# Patient Record
Sex: FEMALE | Race: BLACK OR AFRICAN AMERICAN | Employment: FULL TIME | URBAN - METROPOLITAN AREA
[De-identification: names, ages, dates, MRNs, and addresses within clinical notes are randomized per-mention and may not be internally consistent; named-entity substitution may affect disease eponyms.]

---

## 2023-07-12 ENCOUNTER — OFFICE VISIT (OUTPATIENT)
Dept: PRIMARY CARE | Facility: CLINIC | Age: 38
End: 2023-07-12
Payer: COMMERCIAL

## 2023-07-12 VITALS
HEIGHT: 65 IN | HEART RATE: 88 BPM | DIASTOLIC BLOOD PRESSURE: 100 MMHG | SYSTOLIC BLOOD PRESSURE: 157 MMHG | WEIGHT: 150 LBS | BODY MASS INDEX: 24.99 KG/M2

## 2023-07-12 DIAGNOSIS — D50.8 IRON DEFICIENCY ANEMIA SECONDARY TO INADEQUATE DIETARY IRON INTAKE: ICD-10-CM

## 2023-07-12 DIAGNOSIS — Z00.00 ANNUAL PHYSICAL EXAM: Primary | ICD-10-CM

## 2023-07-12 DIAGNOSIS — I10 BENIGN HYPERTENSION: ICD-10-CM

## 2023-07-12 PROBLEM — D50.9 IRON DEFICIENCY ANEMIA: Status: ACTIVE | Noted: 2021-03-12

## 2023-07-12 PROBLEM — R73.03 PREDIABETES: Status: ACTIVE | Noted: 2021-03-10

## 2023-07-12 PROBLEM — H52.203 MYOPIA OF BOTH EYES WITH ASTIGMATISM: Status: ACTIVE | Noted: 2023-07-12

## 2023-07-12 PROBLEM — R03.0 ELEVATED BLOOD PRESSURE READING: Status: ACTIVE | Noted: 2021-03-10

## 2023-07-12 PROBLEM — H52.13 MYOPIA OF BOTH EYES WITH ASTIGMATISM: Status: ACTIVE | Noted: 2023-07-12

## 2023-07-12 LAB
ALANINE AMINOTRANSFERASE (SGPT) (U/L) IN SER/PLAS: 10 U/L (ref 7–45)
ALBUMIN (G/DL) IN SER/PLAS: 4.4 G/DL (ref 3.4–5)
ALKALINE PHOSPHATASE (U/L) IN SER/PLAS: 56 U/L (ref 33–110)
ANION GAP IN SER/PLAS: 15 MMOL/L (ref 10–20)
ASPARTATE AMINOTRANSFERASE (SGOT) (U/L) IN SER/PLAS: 16 U/L (ref 9–39)
BASOPHILS (10*3/UL) IN BLOOD BY AUTOMATED COUNT: 0.04 X10E9/L (ref 0–0.1)
BASOPHILS/100 LEUKOCYTES IN BLOOD BY AUTOMATED COUNT: 0.8 % (ref 0–2)
BILIRUBIN TOTAL (MG/DL) IN SER/PLAS: 0.4 MG/DL (ref 0–1.2)
CALCIDIOL (25 OH VITAMIN D3) (NG/ML) IN SER/PLAS: 11 NG/ML
CALCIUM (MG/DL) IN SER/PLAS: 9.9 MG/DL (ref 8.6–10.6)
CARBON DIOXIDE, TOTAL (MMOL/L) IN SER/PLAS: 24 MMOL/L (ref 21–32)
CHLORIDE (MMOL/L) IN SER/PLAS: 102 MMOL/L (ref 98–107)
CHOLESTEROL (MG/DL) IN SER/PLAS: 142 MG/DL (ref 0–199)
CHOLESTEROL IN HDL (MG/DL) IN SER/PLAS: 60.4 MG/DL
CHOLESTEROL/HDL RATIO: 2.4
COBALAMIN (VITAMIN B12) (PG/ML) IN SER/PLAS: 1019 PG/ML (ref 211–911)
CREATININE (MG/DL) IN SER/PLAS: 0.81 MG/DL (ref 0.5–1.05)
EOSINOPHILS (10*3/UL) IN BLOOD BY AUTOMATED COUNT: 0.04 X10E9/L (ref 0–0.7)
EOSINOPHILS/100 LEUKOCYTES IN BLOOD BY AUTOMATED COUNT: 0.8 % (ref 0–6)
ERYTHROCYTE DISTRIBUTION WIDTH (RATIO) BY AUTOMATED COUNT: 14.8 % (ref 11.5–14.5)
ERYTHROCYTE MEAN CORPUSCULAR HEMOGLOBIN CONCENTRATION (G/DL) BY AUTOMATED: 31.5 G/DL (ref 32–36)
ERYTHROCYTE MEAN CORPUSCULAR VOLUME (FL) BY AUTOMATED COUNT: 89 FL (ref 80–100)
ERYTHROCYTES (10*6/UL) IN BLOOD BY AUTOMATED COUNT: 4.26 X10E12/L (ref 4–5.2)
GFR FEMALE: >90 ML/MIN/1.73M2
GLUCOSE (MG/DL) IN SER/PLAS: 85 MG/DL (ref 74–99)
HEMATOCRIT (%) IN BLOOD BY AUTOMATED COUNT: 38.1 % (ref 36–46)
HEMOGLOBIN (G/DL) IN BLOOD: 12 G/DL (ref 12–16)
IMMATURE GRANULOCYTES/100 LEUKOCYTES IN BLOOD BY AUTOMATED COUNT: 0 % (ref 0–0.9)
LDL: 65 MG/DL (ref 0–99)
LEUKOCYTES (10*3/UL) IN BLOOD BY AUTOMATED COUNT: 4.9 X10E9/L (ref 4.4–11.3)
LYMPHOCYTES (10*3/UL) IN BLOOD BY AUTOMATED COUNT: 2.16 X10E9/L (ref 1.2–4.8)
LYMPHOCYTES/100 LEUKOCYTES IN BLOOD BY AUTOMATED COUNT: 43.9 % (ref 13–44)
MONOCYTES (10*3/UL) IN BLOOD BY AUTOMATED COUNT: 0.52 X10E9/L (ref 0.1–1)
MONOCYTES/100 LEUKOCYTES IN BLOOD BY AUTOMATED COUNT: 10.6 % (ref 2–10)
NEUTROPHILS (10*3/UL) IN BLOOD BY AUTOMATED COUNT: 2.16 X10E9/L (ref 1.2–7.7)
NEUTROPHILS/100 LEUKOCYTES IN BLOOD BY AUTOMATED COUNT: 43.9 % (ref 40–80)
NRBC (PER 100 WBCS) BY AUTOMATED COUNT: 0 /100 WBC (ref 0–0)
PLATELETS (10*3/UL) IN BLOOD AUTOMATED COUNT: 274 X10E9/L (ref 150–450)
POTASSIUM (MMOL/L) IN SER/PLAS: 4.5 MMOL/L (ref 3.5–5.3)
PROTEIN TOTAL: 8.3 G/DL (ref 6.4–8.2)
SODIUM (MMOL/L) IN SER/PLAS: 136 MMOL/L (ref 136–145)
THYROTROPIN (MIU/L) IN SER/PLAS BY DETECTION LIMIT <= 0.05 MIU/L: 1.61 MIU/L (ref 0.44–3.98)
TRIGLYCERIDE (MG/DL) IN SER/PLAS: 81 MG/DL (ref 0–149)
UREA NITROGEN (MG/DL) IN SER/PLAS: 16 MG/DL (ref 6–23)
VLDL: 16 MG/DL (ref 0–40)

## 2023-07-12 PROCEDURE — 82607 VITAMIN B-12: CPT

## 2023-07-12 PROCEDURE — 83550 IRON BINDING TEST: CPT

## 2023-07-12 PROCEDURE — 85025 COMPLETE CBC W/AUTO DIFF WBC: CPT

## 2023-07-12 PROCEDURE — 99203 OFFICE O/P NEW LOW 30 MIN: CPT | Performed by: NURSE PRACTITIONER

## 2023-07-12 PROCEDURE — 82306 VITAMIN D 25 HYDROXY: CPT

## 2023-07-12 PROCEDURE — 83540 ASSAY OF IRON: CPT

## 2023-07-12 PROCEDURE — 80061 LIPID PANEL: CPT

## 2023-07-12 PROCEDURE — 84443 ASSAY THYROID STIM HORMONE: CPT

## 2023-07-12 PROCEDURE — 93000 ELECTROCARDIOGRAM COMPLETE: CPT | Performed by: NURSE PRACTITIONER

## 2023-07-12 PROCEDURE — 3077F SYST BP >= 140 MM HG: CPT | Performed by: NURSE PRACTITIONER

## 2023-07-12 PROCEDURE — 99385 PREV VISIT NEW AGE 18-39: CPT | Performed by: NURSE PRACTITIONER

## 2023-07-12 PROCEDURE — 80053 COMPREHEN METABOLIC PANEL: CPT

## 2023-07-12 PROCEDURE — 3080F DIAST BP >= 90 MM HG: CPT | Performed by: NURSE PRACTITIONER

## 2023-07-12 PROCEDURE — 1036F TOBACCO NON-USER: CPT | Performed by: NURSE PRACTITIONER

## 2023-07-12 RX ORDER — OMEPRAZOLE 20 MG/1
20 CAPSULE, DELAYED RELEASE ORAL
COMMUNITY
Start: 2018-03-08

## 2023-07-12 RX ORDER — AMLODIPINE BESYLATE 5 MG/1
5 TABLET ORAL DAILY
Qty: 90 TABLET | Refills: 0 | Status: SHIPPED | OUTPATIENT
Start: 2023-07-12 | End: 2024-01-05

## 2023-07-12 ASSESSMENT — PATIENT HEALTH QUESTIONNAIRE - PHQ9
1. LITTLE INTEREST OR PLEASURE IN DOING THINGS: NOT AT ALL
2. FEELING DOWN, DEPRESSED OR HOPELESS: NOT AT ALL
SUM OF ALL RESPONSES TO PHQ9 QUESTIONS 1 AND 2: 0

## 2023-07-12 ASSESSMENT — ENCOUNTER SYMPTOMS
DEPRESSION: 0
LOSS OF SENSATION IN FEET: 0
OCCASIONAL FEELINGS OF UNSTEADINESS: 0

## 2023-07-13 LAB
IRON (UG/DL) IN SER/PLAS: 42 UG/DL (ref 35–150)
IRON BINDING CAPACITY (UG/DL) IN SER/PLAS: 449 UG/DL (ref 240–445)
IRON SATURATION (%) IN SER/PLAS: 9 % (ref 25–45)

## 2023-07-14 DIAGNOSIS — R79.89 LOW VITAMIN D LEVEL: ICD-10-CM

## 2023-07-14 RX ORDER — ERGOCALCIFEROL 1.25 MG/1
50000 CAPSULE ORAL
Qty: 12 CAPSULE | Refills: 1 | Status: SHIPPED | OUTPATIENT
Start: 2023-07-14 | End: 2024-01-25 | Stop reason: SDUPTHER

## 2023-07-14 NOTE — TELEPHONE ENCOUNTER
----- Message from SAV Cheatham sent at 7/14/2023 12:10 PM EDT -----  Vitamin D is low. Counseled on foods high in vitamin D (tuna, salmon, milk, orange juice, soy milk, cereals, beef liver, cheese, egg yolks and mushrooms). Start vitamin D 50,000 units weekly for 6 months, then switch to OTC vitamin D 2000 units daily.  Rest of blood work fairly normal.

## 2023-07-14 NOTE — PROGRESS NOTES
"Reason for Visit: Annual Physical Exam    HPI: Maddy is a 38 year old female who presents to the office today as a new patient for a physical exam and concerns of high blood pressure. She reported that she has had some headaches. No dizziness, chest pain, heart palpitations or shortness of breath.     Past surgical history of D&C in 2004. Medical history of acid reflux and iron deficiency anemia. Currently on Omeprazole. No known drug allergies.    She is . Has 4 children; ages 19, 17, 13 & 11. She works as a RN at  [employer]. Monthly menses has been normal. Had pap on 3/19/2021 (result normal). She does not smoke cigarettes or use recreational drugs. Drinks alcohol socially.     Mother with history of lung cancer with brain metastasis; diagnosed in 2019. Mother is alive. Maternal grandmother and sister with history of hypertension.      Active Problem List  Patient Active Problem List   Diagnosis    Elevated blood pressure reading    Human papilloma virus infection    Iron deficiency anemia secondary to inadequate dietary iron intake    Iron deficiency anemia    Myopia of both eyes with astigmatism    Prediabetes       Comprehensive Medical/Surgical/Social/Family History  History reviewed. No pertinent past medical history.  History reviewed. No pertinent surgical history.  Social History     Social History Narrative    Not on file         Allergies and Medications  Patient has no known allergies.  Current Outpatient Medications on File Prior to Visit   Medication Sig Dispense Refill    omeprazole (PriLOSEC) 20 mg DR capsule Take 1 capsule (20 mg) by mouth once daily in the morning. Take before meals.       No current facility-administered medications on file prior to visit.         ROS otherwise negative aside from what was mentioned above in HPI.    Vitals  BP (!) 157/100   Pulse 88   Ht 1.651 m (5' 5\")   Wt 68 kg (150 lb)   BMI 24.96 kg/m²   Body mass index is 24.96 kg/m².  Physical Exam  Gen: " Alert, NAD  HEENT:  PERRLA, EOMI, conjunctiva and sclera normal in appearance. External auditory canals/TMs normal; Oral cavity and posterior pharynx without lesions/exudate  Neck:  Supple with FROM; No masses/nodes palpable; Thyroid nontender and without nodules; No FARIBA  Respiratory:  Lungs CTAB  Cardiovascular:  Heart RRR. No M/R/G. Peripheral pulses equal bilaterally  Abdomen:  Soft, nontender, BS present throughout; No R/G/R; No HSM or masses palpated  Extremities:  FROM all extremities; Muscle strength grossly normal with good tone  Neuro:  CN II-XII intact; Reflexes 2+/2+; Gross motor and sensory intact  Skin:  No suspicious lesions present  Breast: No masses, skin lesions or nipple discharges, no axillary lymphadenopathy    Assessment and Plan:  Problem List Items Addressed This Visit          Hematology and Neoplasia    Iron deficiency anemia secondary to inadequate dietary iron intake    Overview     Last Assessment & Plan: Formatting of this note is different from the original. Will check CBC. Component 10/19/2018 3/10/2021 Hemoglobin 11.5 (L) 9.8 (L) Hematocrit 35.8 (L) 32.1 (L) MCV 87 75 (L) US PELVIS+BLADDER   CLINICAL HISTORY follow up of ovarian cyst  COMPARISON June 17, 2016  TECHNIQUE Transabdominal and transvaginal pelvic ultrasound.  FINDINGS The uterus is mildly enlarged measuring 9.8 x 5.5 x 6.6 cm. The endometrial echo complex is normal in thickness measuring 7 mm. No focal uterine masses are identified.   The right ovary is normal in size measuring 3 x 1.8 x 2.4 cm, with physiologic follicles. The left ovary is also normal in appearance with physiologic follicles measuring 3.1 x 1.7 x 2.3 cm. The previously noted hemorrhagic left ovarian cyst has resolved.  There is trace, physiologic free fluid.  IMPRESSION Interval resolution of the previously noted hemorrhagic left ovarian cyst.  Normal pelvic ultrasound.  MACRO NONE Specimen Collected: 02/06/17         Relevant Orders    Iron and TIBC  (Completed)     Other Visit Diagnoses       Annual physical exam    -  Primary    Relevant Orders    CBC and Auto Differential (Completed)    Comprehensive Metabolic Panel (Completed)    TSH with reflex to Free T4 if abnormal (Completed)    Vitamin B12 (Completed)    Lipid Panel (Completed)    Vitamin D, Total (Completed)    Benign hypertension        Relevant Medications    amLODIPine (Norvasc) 5 mg tablet    Other Relevant Orders    ECG 12 lead (Clinic Performed)        1) Hypertension: BP elevated. Start Amlodipine 5 mg daily. Monitor daily sodium intake. Counseled on DASH diet. Follow-up in two weeks to recheck BP, with nursing. Would benefit from home BP kit. EKG showed NSR; no ST elevations or depressions.     2) Annual physical: blood work ordered. Screening mammogram at 39.    3) GERD: counseled on foods/liquids that may aggravate GERD (spicy, citrus, tomatoes, fatty foods, chocolate, mint, tea, caffeine and alcohol). Avoid lying down for 2 hours after eating and avoid constricting clothing. Continue Omeprazole.

## 2023-07-17 ENCOUNTER — TELEPHONE (OUTPATIENT)
Dept: PRIMARY CARE | Facility: CLINIC | Age: 38
End: 2023-07-17
Payer: COMMERCIAL

## 2023-08-17 ENCOUNTER — LAB (OUTPATIENT)
Dept: LAB | Facility: LAB | Age: 38
End: 2023-08-17

## 2023-08-17 LAB — TOBACCO SCREEN, URINE: NEGATIVE

## 2024-01-05 DIAGNOSIS — I10 BENIGN HYPERTENSION: ICD-10-CM

## 2024-01-05 RX ORDER — AMLODIPINE BESYLATE 5 MG/1
5 TABLET ORAL DAILY
Qty: 30 TABLET | Refills: 0 | Status: SHIPPED | OUTPATIENT
Start: 2024-01-05 | End: 2024-01-23 | Stop reason: SDUPTHER

## 2024-01-23 ENCOUNTER — OFFICE VISIT (OUTPATIENT)
Dept: PRIMARY CARE | Facility: CLINIC | Age: 39
End: 2024-01-23
Payer: COMMERCIAL

## 2024-01-23 VITALS
HEART RATE: 86 BPM | BODY MASS INDEX: 26.33 KG/M2 | SYSTOLIC BLOOD PRESSURE: 142 MMHG | WEIGHT: 158 LBS | DIASTOLIC BLOOD PRESSURE: 86 MMHG | HEIGHT: 65 IN

## 2024-01-23 DIAGNOSIS — E55.9 VITAMIN D DEFICIENCY: ICD-10-CM

## 2024-01-23 DIAGNOSIS — R74.8 ELEVATED VITAMIN B12 LEVEL: ICD-10-CM

## 2024-01-23 DIAGNOSIS — I10 BENIGN HYPERTENSION: Primary | ICD-10-CM

## 2024-01-23 PROCEDURE — 80053 COMPREHEN METABOLIC PANEL: CPT

## 2024-01-23 PROCEDURE — 82306 VITAMIN D 25 HYDROXY: CPT

## 2024-01-23 PROCEDURE — 99213 OFFICE O/P EST LOW 20 MIN: CPT | Performed by: NURSE PRACTITIONER

## 2024-01-23 PROCEDURE — 82607 VITAMIN B-12: CPT

## 2024-01-23 PROCEDURE — 36415 COLL VENOUS BLD VENIPUNCTURE: CPT

## 2024-01-23 PROCEDURE — 1036F TOBACCO NON-USER: CPT | Performed by: NURSE PRACTITIONER

## 2024-01-23 PROCEDURE — 3077F SYST BP >= 140 MM HG: CPT | Performed by: NURSE PRACTITIONER

## 2024-01-23 PROCEDURE — 3079F DIAST BP 80-89 MM HG: CPT | Performed by: NURSE PRACTITIONER

## 2024-01-23 RX ORDER — AMLODIPINE BESYLATE 5 MG/1
5 TABLET ORAL DAILY
Qty: 90 TABLET | Refills: 1 | Status: SHIPPED | OUTPATIENT
Start: 2024-01-23 | End: 2024-03-27 | Stop reason: SINTOL

## 2024-01-23 RX ORDER — ERGOCALCIFEROL 1.25 MG/1
50000 CAPSULE ORAL
Qty: 12 CAPSULE | Refills: 1 | Status: CANCELLED | OUTPATIENT
Start: 2024-01-23

## 2024-01-23 ASSESSMENT — ENCOUNTER SYMPTOMS
DEPRESSION: 0
LOSS OF SENSATION IN FEET: 0
OCCASIONAL FEELINGS OF UNSTEADINESS: 0

## 2024-01-23 ASSESSMENT — PATIENT HEALTH QUESTIONNAIRE - PHQ9
SUM OF ALL RESPONSES TO PHQ9 QUESTIONS 1 AND 2: 0
2. FEELING DOWN, DEPRESSED OR HOPELESS: NOT AT ALL
1. LITTLE INTEREST OR PLEASURE IN DOING THINGS: NOT AT ALL

## 2024-01-24 LAB
25(OH)D3 SERPL-MCNC: 17 NG/ML (ref 30–100)
ALBUMIN SERPL BCP-MCNC: 4.4 G/DL (ref 3.4–5)
ALP SERPL-CCNC: 45 U/L (ref 33–110)
ALT SERPL W P-5'-P-CCNC: 8 U/L (ref 7–45)
ANION GAP SERPL CALC-SCNC: 13 MMOL/L (ref 10–20)
AST SERPL W P-5'-P-CCNC: 13 U/L (ref 9–39)
BILIRUB SERPL-MCNC: 0.6 MG/DL (ref 0–1.2)
BUN SERPL-MCNC: 15 MG/DL (ref 6–23)
CALCIUM SERPL-MCNC: 9.6 MG/DL (ref 8.6–10.6)
CHLORIDE SERPL-SCNC: 105 MMOL/L (ref 98–107)
CO2 SERPL-SCNC: 24 MMOL/L (ref 21–32)
CREAT SERPL-MCNC: 0.86 MG/DL (ref 0.5–1.05)
EGFRCR SERPLBLD CKD-EPI 2021: 88 ML/MIN/1.73M*2
GLUCOSE SERPL-MCNC: 92 MG/DL (ref 74–99)
POTASSIUM SERPL-SCNC: 4 MMOL/L (ref 3.5–5.3)
PROT SERPL-MCNC: 7.9 G/DL (ref 6.4–8.2)
SODIUM SERPL-SCNC: 138 MMOL/L (ref 136–145)
VIT B12 SERPL-MCNC: 620 PG/ML (ref 211–911)

## 2024-01-24 NOTE — PROGRESS NOTES
"Subjective   Patient ID: Maddy Lewis is a 38 y.o. female who presents for Follow-up (Medication follow up).    Maddy presents to the office today for a follow-up on blood pressure. She denied headache, dizziness, chest pain, heart palpitations or shortness of breath. Has been taking Amlodipine daily.        Review of Systems   All other systems reviewed and are negative.      Objective   /86   Pulse 86   Ht 1.651 m (5' 5\")   Wt 71.7 kg (158 lb)   BMI 26.29 kg/m²     Physical Exam  Constitutional:       Appearance: Normal appearance.   HENT:      Head: Normocephalic and atraumatic.      Right Ear: Tympanic membrane normal.      Left Ear: Tympanic membrane normal.      Nose: Nose normal.      Mouth/Throat:      Mouth: Mucous membranes are moist.      Pharynx: Oropharynx is clear.   Eyes:      Extraocular Movements: Extraocular movements intact.      Conjunctiva/sclera: Conjunctivae normal.      Pupils: Pupils are equal, round, and reactive to light.   Cardiovascular:      Rate and Rhythm: Normal rate and regular rhythm.      Pulses: Normal pulses.      Heart sounds: Normal heart sounds.   Pulmonary:      Effort: Pulmonary effort is normal.      Breath sounds: Normal breath sounds.   Abdominal:      General: Abdomen is flat. Bowel sounds are normal.      Palpations: Abdomen is soft.   Musculoskeletal:         General: Normal range of motion.      Cervical back: Normal range of motion and neck supple.   Skin:     General: Skin is warm and dry.      Capillary Refill: Capillary refill takes less than 2 seconds.   Neurological:      General: No focal deficit present.      Mental Status: She is alert and oriented to person, place, and time. Mental status is at baseline.   Psychiatric:         Mood and Affect: Mood normal.         Behavior: Behavior normal.         Thought Content: Thought content normal.         Judgment: Judgment normal.         Assessment/Plan   Problem List Items Addressed This Visit  "   None  Visit Diagnoses         Codes    Benign hypertension    -  Primary I10    Relevant Medications    amLODIPine (Norvasc) 5 mg tablet    Other Relevant Orders    Comprehensive Metabolic Panel    Vitamin D deficiency     E55.9    Relevant Orders    Vitamin D 25-Hydroxy,Total (for eval of Vitamin D levels)    Elevated vitamin B12 level     R74.8    Relevant Orders    Vitamin B12          1) Hypertension: stable. Continue Amlodipine. Monitor daily sodium intake. Due for annual physical in July. Will need pap (last done in March, 2021) and screening mammogram.    2) Vitamin D deficiency: vitamin D level ordered. Counseled on foods high in vitamin D (tuna, salmon, milk, orange juice, soy milk, cereals, beef liver, cheese, egg yolks and mushrooms).    3) Elevated vitamin B12: vitamin B12 level ordered.

## 2024-01-25 DIAGNOSIS — R79.89 LOW VITAMIN D LEVEL: ICD-10-CM

## 2024-01-25 RX ORDER — ERGOCALCIFEROL 1.25 MG/1
50000 CAPSULE ORAL
Qty: 13 CAPSULE | Refills: 1 | Status: SHIPPED | OUTPATIENT
Start: 2024-01-25

## 2024-02-21 ENCOUNTER — OFFICE VISIT (OUTPATIENT)
Dept: PRIMARY CARE | Facility: CLINIC | Age: 39
End: 2024-02-21
Payer: COMMERCIAL

## 2024-02-21 VITALS
HEART RATE: 85 BPM | DIASTOLIC BLOOD PRESSURE: 86 MMHG | SYSTOLIC BLOOD PRESSURE: 145 MMHG | WEIGHT: 159 LBS | BODY MASS INDEX: 26.49 KG/M2 | HEIGHT: 65 IN

## 2024-02-21 DIAGNOSIS — R00.2 HEART PALPITATIONS: Primary | ICD-10-CM

## 2024-02-21 DIAGNOSIS — I10 BENIGN HYPERTENSION: ICD-10-CM

## 2024-02-21 PROCEDURE — 99214 OFFICE O/P EST MOD 30 MIN: CPT | Performed by: NURSE PRACTITIONER

## 2024-02-21 PROCEDURE — 3077F SYST BP >= 140 MM HG: CPT | Performed by: NURSE PRACTITIONER

## 2024-02-21 PROCEDURE — 3079F DIAST BP 80-89 MM HG: CPT | Performed by: NURSE PRACTITIONER

## 2024-02-21 PROCEDURE — 93000 ELECTROCARDIOGRAM COMPLETE: CPT | Performed by: NURSE PRACTITIONER

## 2024-02-21 PROCEDURE — 1036F TOBACCO NON-USER: CPT | Performed by: NURSE PRACTITIONER

## 2024-02-21 RX ORDER — HYDROCHLOROTHIAZIDE 12.5 MG/1
12.5 TABLET ORAL DAILY
Qty: 90 TABLET | Refills: 0 | Status: SHIPPED | OUTPATIENT
Start: 2024-02-21

## 2024-02-21 ASSESSMENT — ENCOUNTER SYMPTOMS
DEPRESSION: 0
OCCASIONAL FEELINGS OF UNSTEADINESS: 0
LOSS OF SENSATION IN FEET: 0
PALPITATIONS: 1

## 2024-02-21 NOTE — PROGRESS NOTES
"Subjective   Patient ID: Maddy Lewis is a 38 y.o. female who presents for Palpitations.    Maddy presents to the office today with concerns of heart palpitations. She reported that she had heart palpitations approximately \"40 times\" yesterday within a 24 hour time period. She attributes her heart palpitations to Amlodipine. Stated she didn't take Amlodipine last night and only had 2 episodes of heart palpitations today. Denied chest pain, shortness of breath, headache or dizziness.    Palpitations   This is a new problem. The problem has been gradually worsening.        Review of Systems   Cardiovascular:  Positive for palpitations.   All other systems reviewed and are negative.      Objective   /86   Pulse 85   Ht 1.651 m (5' 5\")   Wt 72.1 kg (159 lb)   BMI 26.46 kg/m²     Physical Exam  Constitutional:       Appearance: Normal appearance.   HENT:      Head: Normocephalic and atraumatic.      Right Ear: Tympanic membrane normal.      Left Ear: Tympanic membrane normal.      Nose: Nose normal.      Mouth/Throat:      Mouth: Mucous membranes are moist.      Pharynx: Oropharynx is clear.   Eyes:      Extraocular Movements: Extraocular movements intact.      Conjunctiva/sclera: Conjunctivae normal.      Pupils: Pupils are equal, round, and reactive to light.   Cardiovascular:      Rate and Rhythm: Normal rate and regular rhythm.      Pulses: Normal pulses.      Heart sounds: Normal heart sounds.   Pulmonary:      Effort: Pulmonary effort is normal.      Breath sounds: Normal breath sounds.   Abdominal:      General: Abdomen is flat. Bowel sounds are normal.      Palpations: Abdomen is soft.   Musculoskeletal:         General: Normal range of motion.      Cervical back: Normal range of motion and neck supple.   Skin:     General: Skin is warm and dry.      Capillary Refill: Capillary refill takes less than 2 seconds.   Neurological:      General: No focal deficit present.      Mental Status: She is alert and " oriented to person, place, and time. Mental status is at baseline.   Psychiatric:         Mood and Affect: Mood normal.         Behavior: Behavior normal.         Thought Content: Thought content normal.         Judgment: Judgment normal.         Assessment/Plan   Problem List Items Addressed This Visit    None  Visit Diagnoses         Codes    Heart palpitations    -  Primary R00.2    Relevant Orders    ECG 12 lead (Clinic Performed)    Benign hypertension     I10        1) Heart palpitations: EKG showed NSR. If heart palpitations persist, will need holter monitor.     2) Hypertension: discontinue Amlodipine. Start Hydrochlorothiazide 12.5 mg daily. Counseled on side effects of Hydrochlorothiazide (hypokalemia, hyponatremia, hypomagnesemia, hypercalcemia, dizziness, orthostatic hypotension). Monitor BP at home. Please Follow-up with me in two weeks to recheck BP and BMP.

## 2024-03-27 ENCOUNTER — OFFICE VISIT (OUTPATIENT)
Dept: PRIMARY CARE | Facility: CLINIC | Age: 39
End: 2024-03-27
Payer: COMMERCIAL

## 2024-03-27 VITALS
BODY MASS INDEX: 26.63 KG/M2 | DIASTOLIC BLOOD PRESSURE: 103 MMHG | WEIGHT: 160 LBS | HEART RATE: 88 BPM | SYSTOLIC BLOOD PRESSURE: 163 MMHG

## 2024-03-27 DIAGNOSIS — I10 BENIGN HYPERTENSION: ICD-10-CM

## 2024-03-27 DIAGNOSIS — Z01.419 ENCOUNTER FOR GYNECOLOGICAL EXAMINATION WITHOUT ABNORMAL FINDING: Primary | ICD-10-CM

## 2024-03-27 PROCEDURE — 99214 OFFICE O/P EST MOD 30 MIN: CPT | Performed by: NURSE PRACTITIONER

## 2024-03-27 PROCEDURE — 3080F DIAST BP >= 90 MM HG: CPT | Performed by: NURSE PRACTITIONER

## 2024-03-27 PROCEDURE — 3077F SYST BP >= 140 MM HG: CPT | Performed by: NURSE PRACTITIONER

## 2024-03-27 PROCEDURE — 87624 HPV HI-RISK TYP POOLED RSLT: CPT

## 2024-03-27 PROCEDURE — 1036F TOBACCO NON-USER: CPT | Performed by: NURSE PRACTITIONER

## 2024-03-27 PROCEDURE — 88175 CYTOPATH C/V AUTO FLUID REDO: CPT

## 2024-03-27 ASSESSMENT — ENCOUNTER SYMPTOMS
DEPRESSION: 0
LOSS OF SENSATION IN FEET: 0
OCCASIONAL FEELINGS OF UNSTEADINESS: 0

## 2024-03-27 ASSESSMENT — PATIENT HEALTH QUESTIONNAIRE - PHQ9
2. FEELING DOWN, DEPRESSED OR HOPELESS: NOT AT ALL
1. LITTLE INTEREST OR PLEASURE IN DOING THINGS: NOT AT ALL
SUM OF ALL RESPONSES TO PHQ9 QUESTIONS 1 AND 2: 0

## 2024-03-27 ASSESSMENT — ANXIETY QUESTIONNAIRES
GAD7 TOTAL SCORE: 0
4. TROUBLE RELAXING: NOT AT ALL
2. NOT BEING ABLE TO STOP OR CONTROL WORRYING: NOT AT ALL
3. WORRYING TOO MUCH ABOUT DIFFERENT THINGS: NOT AT ALL
1. FEELING NERVOUS, ANXIOUS, OR ON EDGE: NOT AT ALL
6. BECOMING EASILY ANNOYED OR IRRITABLE: NOT AT ALL
5. BEING SO RESTLESS THAT IT IS HARD TO SIT STILL: NOT AT ALL
7. FEELING AFRAID AS IF SOMETHING AWFUL MIGHT HAPPEN: NOT AT ALL
IF YOU CHECKED OFF ANY PROBLEMS ON THIS QUESTIONNAIRE, HOW DIFFICULT HAVE THESE PROBLEMS MADE IT FOR YOU TO DO YOUR WORK, TAKE CARE OF THINGS AT HOME, OR GET ALONG WITH OTHER PEOPLE: NOT DIFFICULT AT ALL

## 2024-03-27 ASSESSMENT — LIFESTYLE VARIABLES
SKIP TO QUESTIONS 9-10: 1
AUDIT-C TOTAL SCORE: 0
HOW OFTEN DO YOU HAVE A DRINK CONTAINING ALCOHOL: NEVER
HOW OFTEN DO YOU HAVE SIX OR MORE DRINKS ON ONE OCCASION: NEVER
HOW MANY STANDARD DRINKS CONTAINING ALCOHOL DO YOU HAVE ON A TYPICAL DAY: PATIENT DOES NOT DRINK

## 2024-03-27 NOTE — PROGRESS NOTES
"Subjective   Patient ID: Maddy Lewis is a 38 y.o. female who presents for 1 MO FUV.    Maddy presents to the office today for a follow-up on blood pressure and pap. She reported that she stopped taking the hydrochlorothiazide as she was \"running to the bathroom all day\". She has been watching her daily sodium intake. She denied headache, dizziness, chest pain, heart palpitations or shortness of breath. Blood pressure at home has been running in 120s (systolic) over 80s (diastolic).     Due for pap. Denied vaginal discharge, vaginal odor or dysuria. LMP 3 weeks ago.         Review of Systems   All other systems reviewed and are negative.      Objective   BP (!) 163/103   Pulse 88   Wt 72.6 kg (160 lb)   BMI 26.63 kg/m²     Physical Exam  Constitutional:       Appearance: Normal appearance.   HENT:      Head: Normocephalic and atraumatic.      Right Ear: Tympanic membrane normal.      Left Ear: Tympanic membrane normal.      Nose: Nose normal.      Mouth/Throat:      Mouth: Mucous membranes are moist.      Pharynx: Oropharynx is clear.   Eyes:      Extraocular Movements: Extraocular movements intact.      Conjunctiva/sclera: Conjunctivae normal.      Pupils: Pupils are equal, round, and reactive to light.   Cardiovascular:      Rate and Rhythm: Normal rate and regular rhythm.      Pulses: Normal pulses.      Heart sounds: Normal heart sounds.   Pulmonary:      Effort: Pulmonary effort is normal.      Breath sounds: Normal breath sounds.   Abdominal:      General: Abdomen is flat. Bowel sounds are normal.      Palpations: Abdomen is soft.   Musculoskeletal:         General: Normal range of motion.      Cervical back: Normal range of motion and neck supple.   Skin:     General: Skin is warm and dry.      Capillary Refill: Capillary refill takes less than 2 seconds.   Neurological:      General: No focal deficit present.      Mental Status: She is alert and oriented to person, place, and time. Mental status is at " baseline.   Psychiatric:         Mood and Affect: Mood normal.         Behavior: Behavior normal.         Thought Content: Thought content normal.         Judgment: Judgment normal.       Assessment/Plan   Problem List Items Addressed This Visit    None  Visit Diagnoses         Codes    Encounter for gynecological examination without abnormal finding    -  Primary Z01.419    Relevant Orders    THINPREP PAP TEST    Benign hypertension     I10        1) Hypertension: BP elevated. Discussed with you the need to restart antihypertensive medication as your BP is not controlled. Offered you different class of medication since hydrochlorothiazide is causing you to urinate frequency. You wish to restart hydrochlorothiazide. Instructed to monitor daily sodium intake; DASH diet. Follow-up in 2 weeks to recheck BP, with nursing.    2) Routine GYN: pap done. You tolerated well.

## 2024-04-02 LAB
CYTOLOGY CMNT CVX/VAG CYTO-IMP: NORMAL
HPV HR 12 DNA GENITAL QL NAA+PROBE: NEGATIVE
HPV HR GENOTYPES PNL CVX NAA+PROBE: NEGATIVE
HPV16 DNA SPEC QL NAA+PROBE: NEGATIVE
HPV18 DNA SPEC QL NAA+PROBE: NEGATIVE
LAB AP HPV GENOTYPE QUESTION: YES
LAB AP HPV HR: NORMAL
LABORATORY COMMENT REPORT: NORMAL
PATH REPORT.TOTAL CANCER: NORMAL

## 2024-09-09 DIAGNOSIS — E55.9 VITAMIN D DEFICIENCY: Primary | ICD-10-CM

## 2024-09-09 RX ORDER — CHOLECALCIFEROL (VITAMIN D3) 50 MCG
50 TABLET ORAL DAILY
Qty: 90 TABLET | Refills: 0 | Status: SHIPPED | OUTPATIENT
Start: 2024-09-09

## 2024-12-12 ENCOUNTER — TELEMEDICINE (OUTPATIENT)
Dept: PRIMARY CARE | Facility: CLINIC | Age: 39
End: 2024-12-12
Payer: COMMERCIAL

## 2024-12-12 DIAGNOSIS — R03.0 ELEVATED BP WITHOUT DIAGNOSIS OF HYPERTENSION: Primary | ICD-10-CM

## 2024-12-12 PROCEDURE — 99214 OFFICE O/P EST MOD 30 MIN: CPT | Performed by: FAMILY MEDICINE

## 2024-12-12 PROCEDURE — 1036F TOBACCO NON-USER: CPT | Performed by: FAMILY MEDICINE

## 2024-12-12 ASSESSMENT — ENCOUNTER SYMPTOMS
SHORTNESS OF BREATH: 0
HYPERTENSION: 1
WEAKNESS: 0
HEADACHES: 1
DIZZINESS: 0
PALPITATIONS: 0
ORTHOPNEA: 0
LIGHT-HEADEDNESS: 0
ACTIVITY CHANGE: 0
BLURRED VISION: 0
CHILLS: 0
APPETITE CHANGE: 0
CHEST TIGHTNESS: 0
FATIGUE: 0
PSYCHIATRIC NEGATIVE: 1

## 2024-12-13 NOTE — PROGRESS NOTES
Subjective   Patient ID: Maddy Lewis is a 39 y.o. female who presents for elevated BP.     An interactive audio and video telecommunication system which permits real time communications between the patient (at the originating site) and provider (at the distant site) was utilized to provide this telehealth service. At the start of the visit, I introduced myself as the nurse practitioner for their visit today, Yolande HOWELL. I then verified the patients name, , and current physical location. Patient confirmed their current location was IN THE [facility]. If they were currently outside of the [facility] [place], the call was terminated and the patient was directed to alternative means of care. The patient was made aware of the limitations of the telehealth visit. They will not be physically examined and all issues may not be appropriate for a telehealth visit. If at any time this provider felt the visit was not appropriate for a video visit or more advanced care was necessary, the call would be terminated and the patient would be advised to seek proper, in person, medical attention. Pt verbalizes understanding and agrees to continue with televisit.     Pt presents for c/o elevated BP reading with HA. Taking hydrochlorothiazide. BP at home reading 157/101. Repeat 160/96. Taking hydrochlorothiazide as ordered. Missed a dose 3 days ago. No change in diet. No increase in alcohol, smoking. Increased stress. HA for 2 days. Hx of migraines. Usually has them once monthly. Usually respond to Excedrin. Trying tylenol with min improvement. HA mild in back of head. Hx of palpitations. Used to take Norvasc. Was fighting with her daughter this afternoon.         Hypertension  This is a recurrent problem. The current episode started more than 1 year ago. Associated symptoms include headaches. Pertinent negatives include no anxiety, blurred vision, chest pain, malaise/fatigue, orthopnea, palpitations, peripheral edema or  shortness of breath. There are no associated agents to hypertension. Risk factors for coronary artery disease include stress. Past treatments include diuretics.        Review of Systems   Constitutional:  Negative for activity change, appetite change, chills, fatigue and malaise/fatigue.   HENT:  Negative for tinnitus.    Eyes:  Negative for blurred vision and visual disturbance.   Respiratory:  Negative for chest tightness and shortness of breath.    Cardiovascular:  Negative for chest pain, palpitations, orthopnea and leg swelling.   Endocrine: Negative for polyuria.   Neurological:  Positive for headaches. Negative for dizziness, syncope, weakness and light-headedness.   Psychiatric/Behavioral: Negative.         Objective   There were no vitals taken for this visit.    Physical Exam  Vitals and nursing note reviewed.   Constitutional:       General: She is not in acute distress.  Eyes:      Conjunctiva/sclera: Conjunctivae normal.   Neurological:      General: No focal deficit present.      Mental Status: She is alert. Mental status is at baseline.   Psychiatric:         Mood and Affect: Mood normal.         Behavior: Behavior normal.         Thought Content: Thought content normal.         Judgment: Judgment normal.       Physical exam is limited d/t video visit.   Assessment/Plan   Diagnoses and all orders for this visit:  Elevated BP without diagnosis of hypertension    -monitor BP Q am x7 days and report to PCP  -limit stress  -heart healthy diet  -limit caffeine  -call PCP as soon as possible to follow up  -cont hydrochlorothiazide as ordered  -if HA worsens or other s/s develop go to ED or call 911.   -If BP cont to rise go to ED>    The total time spent on this visit was 25 min. This time includes, but is not limited to, reviewing the patient's history, labs, test results, allergies, current medications, interviewing, assessing, examining, diagnosing, counseling, education, placing orders, documenting and  care coordination.

## 2025-01-07 ENCOUNTER — HOSPITAL ENCOUNTER (OUTPATIENT)
Dept: RADIOLOGY | Facility: HOSPITAL | Age: 40
Discharge: HOME | End: 2025-01-07
Payer: COMMERCIAL

## 2025-01-07 VITALS — BODY MASS INDEX: 26.81 KG/M2 | WEIGHT: 160.94 LBS | HEIGHT: 65 IN

## 2025-01-07 DIAGNOSIS — Z12.31 SCREENING MAMMOGRAM FOR BREAST CANCER: ICD-10-CM

## 2025-01-07 PROCEDURE — 77067 SCR MAMMO BI INCL CAD: CPT | Performed by: RADIOLOGY

## 2025-01-07 PROCEDURE — 77067 SCR MAMMO BI INCL CAD: CPT

## 2025-01-07 PROCEDURE — 77063 BREAST TOMOSYNTHESIS BI: CPT | Performed by: RADIOLOGY

## 2025-01-16 ENCOUNTER — APPOINTMENT (OUTPATIENT)
Dept: PRIMARY CARE | Facility: CLINIC | Age: 40
End: 2025-01-16
Payer: COMMERCIAL

## 2025-01-16 DIAGNOSIS — R73.03 PREDIABETES: ICD-10-CM

## 2025-01-16 DIAGNOSIS — Z00.00 ROUTINE GENERAL MEDICAL EXAMINATION AT A HEALTH CARE FACILITY: Primary | ICD-10-CM

## 2025-01-16 DIAGNOSIS — R79.89 LOW VITAMIN D LEVEL: ICD-10-CM

## 2025-01-16 DIAGNOSIS — I10 BENIGN HYPERTENSION: ICD-10-CM

## 2025-01-16 DIAGNOSIS — R03.0 ELEVATED BLOOD PRESSURE READING: ICD-10-CM

## 2025-01-16 LAB — POC HEMOGLOBIN A1C: 6.1 % (ref 4.2–6.5)

## 2025-01-16 PROCEDURE — 3080F DIAST BP >= 90 MM HG: CPT | Performed by: FAMILY MEDICINE

## 2025-01-16 PROCEDURE — 99396 PREV VISIT EST AGE 40-64: CPT | Performed by: FAMILY MEDICINE

## 2025-01-16 PROCEDURE — 3077F SYST BP >= 140 MM HG: CPT | Performed by: FAMILY MEDICINE

## 2025-01-16 PROCEDURE — 3008F BODY MASS INDEX DOCD: CPT | Performed by: FAMILY MEDICINE

## 2025-01-16 PROCEDURE — 83036 HEMOGLOBIN GLYCOSYLATED A1C: CPT | Performed by: FAMILY MEDICINE

## 2025-01-16 RX ORDER — ERGOCALCIFEROL 1.25 MG/1
50000 CAPSULE ORAL
Qty: 13 CAPSULE | Refills: 1 | Status: SHIPPED | OUTPATIENT
Start: 2025-01-19

## 2025-01-16 RX ORDER — HYDROCHLOROTHIAZIDE 12.5 MG/1
12.5 TABLET ORAL DAILY
Qty: 90 TABLET | Refills: 0 | Status: SHIPPED | OUTPATIENT
Start: 2025-01-16 | End: 2025-01-16 | Stop reason: ALTCHOICE

## 2025-01-16 RX ORDER — DILTIAZEM HYDROCHLORIDE 240 MG/1
240 CAPSULE, COATED, EXTENDED RELEASE ORAL DAILY
Qty: 90 CAPSULE | Refills: 1 | Status: SHIPPED | OUTPATIENT
Start: 2025-01-16 | End: 2025-07-15

## 2025-01-16 ASSESSMENT — ENCOUNTER SYMPTOMS
LOSS OF SENSATION IN FEET: 0
DEPRESSION: 0
OCCASIONAL FEELINGS OF UNSTEADINESS: 0

## 2025-01-16 ASSESSMENT — PATIENT HEALTH QUESTIONNAIRE - PHQ9
2. FEELING DOWN, DEPRESSED OR HOPELESS: NOT AT ALL
SUM OF ALL RESPONSES TO PHQ9 QUESTIONS 1 AND 2: 0
1. LITTLE INTEREST OR PLEASURE IN DOING THINGS: NOT AT ALL

## 2025-01-19 VITALS
HEART RATE: 86 BPM | BODY MASS INDEX: 27.16 KG/M2 | HEIGHT: 65 IN | WEIGHT: 163 LBS | DIASTOLIC BLOOD PRESSURE: 90 MMHG | SYSTOLIC BLOOD PRESSURE: 164 MMHG

## 2025-01-19 PROBLEM — Z00.00 ROUTINE GENERAL MEDICAL EXAMINATION AT A HEALTH CARE FACILITY: Status: ACTIVE | Noted: 2025-01-19

## 2025-01-19 PROBLEM — I10 BENIGN HYPERTENSION: Status: ACTIVE | Noted: 2025-01-19

## 2025-01-19 PROBLEM — R79.89 LOW VITAMIN D LEVEL: Status: ACTIVE | Noted: 2025-01-19

## 2025-01-19 NOTE — PROGRESS NOTES
"  Subjective     Patient ID: Maddy Lewis is a 39 y.o. female who presents for Annual Exam, New Patient Visit, and Medication Problem (Waking up in the middle of night to urinate.).      Last Physical : 1 Years ago     Pt's PMH, PSH, SH, FH , meds and allergies was obtained / reviewed and updated .     Dental visits : Y  Vision issues : N  Hearing issues : N    Immunizations : Y    Diet :  could be better  Exercise:  Weight concerns :     Alcohol: as noted in SH  Tobacco: as noted in SH  Recreational drug use : None/ as noted in     Sexually active : Active   Contraception :   Menstrual problems:  Premenopausal/perimenopausal/ postmenopausal:    G:  Parity:  Full term:    Premature:   (s):   Living :  Ab induced:   Ab spontaneous :  Ectopic :   Multiple :    PAP smear :  Mammogram :  Colonoscopy:    Metabolic screening   - Lipid   - Glucose  Blood pressure uncontrolled also gets headaches did not do well on amlodipine hydrochlorothiazide not working  ======================================================    Visit Vitals  /90   Pulse 86   Ht 1.651 m (5' 5\")   Wt 73.9 kg (163 lb)   LMP  (LMP Unknown)   BMI 27.12 kg/m²   OB Status Having periods   Smoking Status Never   BSA 1.84 m²      No LMP recorded (lmp unknown).     =====================================    Review of systems:  Constitutional: no chills, no fever and no night sweats.     Eyes: no blurred vision and no eyesight problems.     ENT: no hearing loss, no nasal congestion, no nasal discharge, no hoarseness and no sore throat.     Cardiovascular: no chest pain, no intermittent leg claudication, no lower extremity edema, no palpitations and no syncope.     Respiratory: no cough, no shortness of breath during exertion, no shortness of breath at rest and no wheezing.     Gastrointestinal: no abdominal pain, no blood in stools, no constipation, no diarrhea, no melena, no nausea, no rectal pain and no vomiting.     Genitourinary: no dysuria, no " change in urinary frequency, no urinary hesitancy, no feelings of urinary urgency and no vaginal discharge.     Musculoskeletal: no arthralgias, no back pain and no myalgias.     Integumentary: no new skin lesions and no rashes.     Neurological: no difficulty walking, no headache, no limb weakness, no numbness and no tingling.     Psychiatric: no anxiety, no depression, no anhedonia and no substance use disorders.     Endocrine: no recent weight gain and no recent weight loss.     Hematologic/Lymphatic: no tendency for easy bruising and no swollen glands.   ============================================================    Physical exam :    Constitutional: Alert and in no acute distress. Well developed, well nourished.     Eyes: Normal external exam. Pupils were equal in size, round, reactive to light (PERRL) with normal accommodation and extraocular movements intact (EOMI).     Ears, Nose, Mouth, and Throat: External inspection of ears and nose: Normal.  Otoscopic examination: Normal.      Neck: No neck mass was observed. Supple.     Cardiovascular: Heart rate and rhythm were normal, normal S1 and S2, no gallops, no murmurs and no pericardial rub    Pulmonary: No respiratory distress. Clear bilateral breath sounds.     Abdomen: Soft nontender; no abdominal mass palpated. No organomegaly.     Musculoskeletal: No joint swelling seen, normal movements of all extremities. Range of motion: Normal.  Muscle strength/tone: Normal.      Skin: Normal skin color and pigmentation, normal skin turgor, and no rash.     Neurologic: Deep tendon reflexes were 2+ and symmetric. Sensation: Normal.     Psychiatric: Judgment and insight: Intact. Mood and affect: Normal.    Lymphatic : Cervical/ axillary/ groin Lns Palpable/ non palpable            All other systems have been reviewed and are negative for complaint.      Assessment/Plan    Problem List Items Addressed This Visit       Elevated blood pressure reading    Prediabetes     Relevant Orders    POCT glycosylated hemoglobin (Hb A1C) manually resulted (Completed)    Routine general medical examination at a health care facility - Primary    Relevant Orders    POCT glycosylated hemoglobin (Hb A1C) manually resulted (Completed)    Lipid Panel    CBC    TSH with reflex to Free T4 if abnormal    Comprehensive Metabolic Panel    Benign hypertension    Relevant Medications    dilTIAZem CD (Cardizem CD) 240 mg 24 hr capsule    Low vitamin D level    Relevant Medications    ergocalciferol (Vitamin D-2) 1.25 MG (99113 UT) capsule    Other Relevant Orders    Vitamin D 25-Hydroxy,Total (for eval of Vitamin D levels)

## 2025-01-24 ENCOUNTER — LAB (OUTPATIENT)
Dept: LAB | Facility: LAB | Age: 40
End: 2025-01-24
Payer: COMMERCIAL

## 2025-01-24 DIAGNOSIS — R79.89 LOW VITAMIN D LEVEL: ICD-10-CM

## 2025-01-24 DIAGNOSIS — Z00.00 ROUTINE GENERAL MEDICAL EXAMINATION AT A HEALTH CARE FACILITY: ICD-10-CM

## 2025-02-24 DIAGNOSIS — I10 BENIGN HYPERTENSION: ICD-10-CM

## 2025-02-24 DIAGNOSIS — D50.8 IRON DEFICIENCY ANEMIA SECONDARY TO INADEQUATE DIETARY IRON INTAKE: ICD-10-CM

## 2025-02-24 DIAGNOSIS — Z13.29 THYROID DISORDER SCREEN: ICD-10-CM

## 2025-02-24 DIAGNOSIS — R79.89 LOW VITAMIN D LEVEL: ICD-10-CM

## 2025-02-24 DIAGNOSIS — R73.03 PREDIABETES: ICD-10-CM

## 2025-03-08 LAB
25(OH)D3+25(OH)D2 SERPL-MCNC: 50 NG/ML (ref 30–100)
ALBUMIN SERPL-MCNC: 4.2 G/DL (ref 3.6–5.1)
ALP SERPL-CCNC: 45 U/L (ref 31–125)
ALT SERPL-CCNC: 10 U/L (ref 6–29)
ANION GAP SERPL CALCULATED.4IONS-SCNC: 8 MMOL/L (CALC) (ref 7–17)
AST SERPL-CCNC: 13 U/L (ref 10–30)
BILIRUB SERPL-MCNC: 0.5 MG/DL (ref 0.2–1.2)
BUN SERPL-MCNC: 14 MG/DL (ref 7–25)
CALCIUM SERPL-MCNC: 9.1 MG/DL (ref 8.6–10.2)
CHLORIDE SERPL-SCNC: 106 MMOL/L (ref 98–110)
CHOLEST SERPL-MCNC: 136 MG/DL
CHOLEST/HDLC SERPL: 2.3 (CALC)
CO2 SERPL-SCNC: 22 MMOL/L (ref 20–32)
CREAT SERPL-MCNC: 0.93 MG/DL (ref 0.5–0.99)
EGFRCR SERPLBLD CKD-EPI 2021: 80 ML/MIN/1.73M2
ERYTHROCYTE [DISTWIDTH] IN BLOOD BY AUTOMATED COUNT: 14.3 % (ref 11–15)
GLUCOSE SERPL-MCNC: 101 MG/DL (ref 65–99)
HCT VFR BLD AUTO: 36.6 % (ref 35–45)
HDLC SERPL-MCNC: 59 MG/DL
HGB BLD-MCNC: 11.9 G/DL (ref 11.7–15.5)
LDLC SERPL CALC-MCNC: 62 MG/DL (CALC)
MCH RBC QN AUTO: 27.5 PG (ref 27–33)
MCHC RBC AUTO-ENTMCNC: 32.5 G/DL (ref 32–36)
MCV RBC AUTO: 84.7 FL (ref 80–100)
NONHDLC SERPL-MCNC: 77 MG/DL (CALC)
PLATELET # BLD AUTO: 275 THOUSAND/UL (ref 140–400)
PMV BLD REES-ECKER: 11.6 FL (ref 7.5–12.5)
POTASSIUM SERPL-SCNC: 4.2 MMOL/L (ref 3.5–5.3)
PROT SERPL-MCNC: 7.5 G/DL (ref 6.1–8.1)
RBC # BLD AUTO: 4.32 MILLION/UL (ref 3.8–5.1)
SODIUM SERPL-SCNC: 136 MMOL/L (ref 135–146)
TRIGL SERPL-MCNC: 72 MG/DL
TSH SERPL-ACNC: 1.31 MIU/L
WBC # BLD AUTO: 5 THOUSAND/UL (ref 3.8–10.8)

## 2025-04-14 ENCOUNTER — TELEPHONE (OUTPATIENT)
Dept: UROLOGY | Facility: HOSPITAL | Age: 40
End: 2025-04-14

## 2025-04-14 DIAGNOSIS — N30.00 ACUTE CYSTITIS WITHOUT HEMATURIA: Primary | ICD-10-CM

## 2025-04-14 RX ORDER — NITROFURANTOIN 25; 75 MG/1; MG/1
100 CAPSULE ORAL EVERY 12 HOURS SCHEDULED
Qty: 10 CAPSULE | Refills: 0 | Status: SHIPPED | OUTPATIENT
Start: 2025-04-14 | End: 2025-04-19

## 2025-07-01 DIAGNOSIS — I10 BENIGN HYPERTENSION: ICD-10-CM

## 2025-07-03 RX ORDER — DILTIAZEM HYDROCHLORIDE 240 MG/1
240 CAPSULE, COATED, EXTENDED RELEASE ORAL DAILY
Qty: 90 CAPSULE | Refills: 1 | Status: SHIPPED | OUTPATIENT
Start: 2025-07-03 | End: 2025-12-30

## 2025-07-22 ENCOUNTER — APPOINTMENT (OUTPATIENT)
Dept: PRIMARY CARE | Facility: CLINIC | Age: 40
End: 2025-07-22
Payer: COMMERCIAL

## 2025-07-22 DIAGNOSIS — R79.89 LOW VITAMIN D LEVEL: ICD-10-CM

## 2025-07-22 DIAGNOSIS — I10 BENIGN HYPERTENSION: Primary | ICD-10-CM

## 2025-07-22 DIAGNOSIS — G43.109 MIGRAINE WITH AURA AND WITHOUT STATUS MIGRAINOSUS, NOT INTRACTABLE: ICD-10-CM

## 2025-07-22 PROCEDURE — 3079F DIAST BP 80-89 MM HG: CPT | Performed by: FAMILY MEDICINE

## 2025-07-22 PROCEDURE — 3008F BODY MASS INDEX DOCD: CPT | Performed by: FAMILY MEDICINE

## 2025-07-22 PROCEDURE — 3075F SYST BP GE 130 - 139MM HG: CPT | Performed by: FAMILY MEDICINE

## 2025-07-22 PROCEDURE — RXMED WILLOW AMBULATORY MEDICATION CHARGE

## 2025-07-22 PROCEDURE — 99214 OFFICE O/P EST MOD 30 MIN: CPT | Performed by: FAMILY MEDICINE

## 2025-07-22 RX ORDER — DILTIAZEM HYDROCHLORIDE 240 MG/1
240 CAPSULE, COATED, EXTENDED RELEASE ORAL DAILY
Qty: 90 CAPSULE | Refills: 1 | Status: SHIPPED | OUTPATIENT
Start: 2025-07-22 | End: 2026-01-18

## 2025-07-22 ASSESSMENT — PATIENT HEALTH QUESTIONNAIRE - PHQ9
1. LITTLE INTEREST OR PLEASURE IN DOING THINGS: NOT AT ALL
SUM OF ALL RESPONSES TO PHQ9 QUESTIONS 1 AND 2: 1
2. FEELING DOWN, DEPRESSED OR HOPELESS: SEVERAL DAYS

## 2025-07-22 NOTE — PATIENT INSTRUCTIONS
. Some suggestions for preventing or controlling your headache:  - Magnesium (preferably glycinate but oxide or sulfate are acceptable 400-500 mg daily is recommended as a potential migraine preventative treatment by the [employer]. Side effects include diarrhea.  - Riboflavin (vitamin B2) can be another beneficial migraine preventative treatment, 200 mg twice a day.  - Another potential beneficial migraine preventative treatment is coenzyme Q10 150 mg twice a day.  - Melatonin 3-5 mg 2-4 hours before bedtime can be helpful for difficulty with sleeping and for headaches, especially cluster headaches but also migraines.  - Avoid triggers that can cause or worsen migraines (food, lack of sleep, stress)  - Keep a diary of your headaches to note how often you get them, how long they last and any other helpful information  - Avoid taking medication for treatment of headaches (NOT preventative medication) more than 3 days per week. This includes both prescription medication and over the counter medication.

## 2025-07-23 PROCEDURE — RXMED WILLOW AMBULATORY MEDICATION CHARGE

## 2025-07-27 VITALS
BODY MASS INDEX: 26.49 KG/M2 | WEIGHT: 159 LBS | HEIGHT: 65 IN | HEART RATE: 66 BPM | SYSTOLIC BLOOD PRESSURE: 130 MMHG | DIASTOLIC BLOOD PRESSURE: 86 MMHG

## 2025-07-27 NOTE — PROGRESS NOTES
"Assessment and Plan:  Problem List Items Addressed This Visit       Benign hypertension - Primary    Relevant Medications    dilTIAZem CD (Cardizem CD) 240 mg 24 hr capsule    Low vitamin D level    Current Assessment & Plan   C/w vitamin D         Migraine with aura and without status migrainosus, not intractable    Relevant Medications    ubrogepant (Ubrelvy) 100 mg tablet         HPI:  Continues to have frequent headaches inspite of diltiazem Unable to take triptan due to HTN  Sleeping ok denies excessive caffiene  BP improved NO CP SON  Taking vitamin D      ROS   Constitutional:  o weight loss. Negative for chills and fever.   HENT: Negative.     Respiratory: Negative.     Cardiovascular: Negative.    Gastrointestinal: Negative.  Negative for nausea and vomiting.   Endocrine: Negative.    Genitourinary: Negative.    Musculoskeletal: Negative.    Skin: Negative.  Negative for rash.   Allergic/Immunologic: Negative.    Neurological: headache  Hematological: Negative.    Psychiatric/Behavioral: Negative.     All other systems reviewed and are negative.      /86   Pulse 66   Ht 1.651 m (5' 5\")   Wt 72.1 kg (159 lb)   BMI 26.46 kg/m²   Body mass index is 26.46 kg/m².  Physical Exam    ENT:      Head: Normocephalic and atraumatic.      Right Ear: Tympanic membrane normal.      Left Ear: Tympanic membrane normal.      Nose: Nose normal.      Mouth/Throat:      Mouth: Mucous membranes are moist.   Eyes:      Pupils: Pupils are equal, round, and reactive to light.   Cardiovascular:      Rate and Rhythm: Normal rate and regular rhythm.      Pulses: Normal pulses.      Heart sounds: Normal heart sounds.   Pulmonary:      Effort: Pulmonary effort is normal.      Breath sounds: Normal breath sounds.   Abdominal:      General: Abdomen is flat. Bowel sounds are normal.      Palpations: Abdomen is soft.   Musculoskeletal:         General: Normal range of motion.      Cervical back: Normal range of motion and neck " supple.   Skin:     General: Skin is warm and dry.      Capillary Refill: Capillary refill takes less than 2 seconds.   Neurological:      General: No focal deficit present.      Mental Status: She is alert and oriented to person, place, and time.   Psychiatric:         Mood and Affect: Mood normal.       Active Problem List  Problem List[1]    Comprehensive Medical/Surgical/Social/Family History  Medical History[2]  Surgical History[3]  Social History     Social History Narrative    Not on file       Allergies and Medications  Amlodipine  Current Outpatient Medications   Medication Instructions    cholecalciferol (VITAMIN D-3) 50 mcg, oral, Daily    dilTIAZem CD (CARDIZEM CD) 240 mg, oral, Daily    ergocalciferol (VITAMIN D-2) 50,000 Units, oral, Once Weekly    omeprazole (PRILOSEC) 20 mg, Daily before breakfast    ubrogepant (Ubrelvy) 100 mg tablet Take 100mg as needed at the onset of headache. May repeat in 2 hours Max 200mg per 24 hours          [1]   Patient Active Problem List  Diagnosis    Elevated blood pressure reading    Human papilloma virus infection    Iron deficiency anemia secondary to inadequate dietary iron intake    Iron deficiency anemia    Myopia of both eyes with astigmatism    Prediabetes    Routine general medical examination at a health care facility    Benign hypertension    Low vitamin D level    Migraine with aura and without status migrainosus, not intractable   [2] No past medical history on file.  [3] No past surgical history on file.

## 2025-07-28 ENCOUNTER — PHARMACY VISIT (OUTPATIENT)
Dept: PHARMACY | Facility: CLINIC | Age: 40
End: 2025-07-28
Payer: COMMERCIAL